# Patient Record
Sex: FEMALE | Race: WHITE | NOT HISPANIC OR LATINO | Employment: UNEMPLOYED | URBAN - METROPOLITAN AREA
[De-identification: names, ages, dates, MRNs, and addresses within clinical notes are randomized per-mention and may not be internally consistent; named-entity substitution may affect disease eponyms.]

---

## 2017-05-21 ENCOUNTER — OFFICE VISIT (OUTPATIENT)
Dept: RETAIL CLINIC | Facility: CLINIC | Age: 42
End: 2017-05-21

## 2017-05-21 VITALS
HEART RATE: 108 BPM | RESPIRATION RATE: 17 BRPM | HEIGHT: 63 IN | TEMPERATURE: 98.1 F | BODY MASS INDEX: 46.07 KG/M2 | OXYGEN SATURATION: 98 % | WEIGHT: 260 LBS

## 2017-05-21 DIAGNOSIS — J01.40 ACUTE NON-RECURRENT PANSINUSITIS: Primary | ICD-10-CM

## 2017-05-21 PROCEDURE — 99203 OFFICE O/P NEW LOW 30 MIN: CPT | Performed by: NURSE PRACTITIONER

## 2017-05-21 RX ORDER — OMEPRAZOLE 40 MG/1
40 CAPSULE, DELAYED RELEASE ORAL DAILY
COMMUNITY

## 2017-05-21 RX ORDER — PSEUDOEPHEDRINE HYDROCHLORIDE 60 MG/1
60 TABLET, FILM COATED ORAL EVERY 6 HOURS PRN
Qty: 20 TABLET | Refills: 0 | Status: SHIPPED | OUTPATIENT
Start: 2017-05-21

## 2017-05-21 RX ORDER — LISINOPRIL AND HYDROCHLOROTHIAZIDE 25; 20 MG/1; MG/1
1 TABLET ORAL DAILY
COMMUNITY

## 2017-05-21 RX ORDER — AMOXICILLIN AND CLAVULANATE POTASSIUM 875; 125 MG/1; MG/1
1 TABLET, FILM COATED ORAL 2 TIMES DAILY
Qty: 20 TABLET | Refills: 0 | Status: SHIPPED | OUTPATIENT
Start: 2017-05-21 | End: 2017-05-31

## 2017-05-21 RX ORDER — AMOXICILLIN AND CLAVULANATE POTASSIUM 875; 125 MG/1; MG/1
1 TABLET, FILM COATED ORAL 2 TIMES DAILY
Qty: 20 TABLET | Refills: 0 | Status: SHIPPED | OUTPATIENT
Start: 2017-05-21 | End: 2017-05-21 | Stop reason: SDUPTHER

## 2019-12-07 ENCOUNTER — HOSPITAL ENCOUNTER (EMERGENCY)
Facility: HOSPITAL | Age: 44
Discharge: HOME | End: 2019-12-07
Attending: EMERGENCY MEDICINE | Admitting: EMERGENCY MEDICINE

## 2019-12-07 VITALS
OXYGEN SATURATION: 89 % | TEMPERATURE: 98.2 F | SYSTOLIC BLOOD PRESSURE: 153 MMHG | DIASTOLIC BLOOD PRESSURE: 103 MMHG | HEART RATE: 108 BPM | BODY MASS INDEX: 46.07 KG/M2 | HEIGHT: 63 IN | RESPIRATION RATE: 18 BRPM | WEIGHT: 260 LBS

## 2019-12-07 DIAGNOSIS — K12.2 ORAL INFECTION: Primary | ICD-10-CM

## 2019-12-07 DIAGNOSIS — S01.502A: ICD-10-CM

## 2019-12-07 PROCEDURE — 99284 EMERGENCY DEPT VISIT MOD MDM: CPT

## 2019-12-07 RX ORDER — IBUPROFEN 600 MG/1
600 TABLET ORAL ONCE
Status: COMPLETED | OUTPATIENT
Start: 2019-12-07 | End: 2019-12-07

## 2019-12-07 RX ORDER — AMOXICILLIN AND CLAVULANATE POTASSIUM 875; 125 MG/1; MG/1
1 TABLET, FILM COATED ORAL EVERY 12 HOURS SCHEDULED
Qty: 14 TABLET | Refills: 0 | Status: SHIPPED | OUTPATIENT
Start: 2019-12-07

## 2019-12-07 RX ADMIN — IBUPROFEN 600 MG: 600 TABLET, FILM COATED ORAL at 11:33

## 2019-12-07 NOTE — DISCHARGE INSTRUCTIONS
Call [facility] today to arrange for very close outpatient follow-up.    Several studies have shown that the combination of ibuprofen and acetaminophen is more effective at pain relief than narcotics.    Take 3 over-the-counter Ibuprofen tablets every 6 hours as needed for pain. Try to take the medication with food. If you develop upper abdominal discomfort, discontinue use.    If not taking another medication which contains Tylenol/acetaminophen, you may also take Tylenol every 6 hours, with a maximum 1,000 mg (two extra strength tablets) per dose.

## 2019-12-07 NOTE — ED PROVIDER NOTES
Subjective   Magy Li is a 44 y.o. female who presents to the ED with c/o facial swelling. Yesterday morning the patient woke up with mild swelling on the right side of her mouth but did not seek medical attention. She woke up today and the swelling had worsened, spreading to the whole right side of her mouth with moderate pain. The patient has been taking Tylenol for the pain but it is not providing much relief. She has no prior history of episodes similar to the current one. The patient complains of wheezing, rhinorrhea, and cough but denies abdominal pain, chest pain, fever, chills, nausea, vomiting, and diarrhea. She has a past medical history of asthma with a surgical history including tubal ligation. The patient denies any allergies and she is a former smoker, quitting in 2007. There are no other acute complaints at this time.      History provided by:  Patient  Facial Swelling   Location:  Right side of her mouth  Quality:  Swelling. dull pain.  Severity:  Moderate  Onset quality:  Sudden  Duration:  1 day  Timing:  Constant  Progression:  Worsening  Chronicity:  New  Context:  Patient awoke yesterday with mild swelling on the right side of her mouth. she woke up this morning with worsening swelling and acute pain.  Relieved by:  Nothing  Worsened by:  Nothing  Ineffective treatments:  Tylenol  Associated symptoms: cough, rhinorrhea and wheezing    Associated symptoms: no abdominal pain, no chest pain, no diarrhea, no fever, no nausea and no vomiting        Review of Systems   Constitutional: Negative for chills and fever.   HENT: Positive for dental problem, facial swelling and rhinorrhea.    Respiratory: Positive for cough and wheezing.    Cardiovascular: Negative for chest pain.   Gastrointestinal: Negative for abdominal pain, diarrhea, nausea and vomiting.   All other systems reviewed and are negative.      Past Medical History:   Diagnosis Date   • Acid reflux    • Allergic    • Arthritis    • Asthma     • Diverticulitis of colon    • Hypertension        No Known Allergies    Past Surgical History:   Procedure Laterality Date   • APPENDECTOMY     • COLONOSCOPY     • TUBAL ABDOMINAL LIGATION         Family History   Problem Relation Age of Onset   • Heart disease Mother    • Diabetes Mother    • Diabetes Father    • Heart disease Father    • Cancer Brother    • Cancer Maternal Grandfather    • Heart disease Paternal Grandmother        Social History     Socioeconomic History   • Marital status:      Spouse name: Not on file   • Number of children: Not on file   • Years of education: Not on file   • Highest education level: Not on file   Tobacco Use   • Smoking status: Former Smoker     Packs/day: 1.00     Years: 15.00     Pack years: 15.00     Types: Cigarettes     Last attempt to quit:      Years since quittin.9   • Smokeless tobacco: Never Used   Substance and Sexual Activity   • Alcohol use: No   • Drug use: No   • Sexual activity: Defer         Objective   Physical Exam   Constitutional: She is oriented to person, place, and time. She appears well-developed and well-nourished. No distress.   The patient appears mildly uncomfortable.   HENT:   Head: Normocephalic and atraumatic.   Mouth/Throat: Oropharynx is clear and moist. Abnormal dentition. No dental abscesses.   Swelling of buccal mucosa in the right cheek just lateral to what appears to be tooth number 5 or 6.  Abrasion on the mucosal surface where tooth appears to be rubbing. This tooth is quite carus.  Throughout the entire oral cavity she has extremely poor dentition with multiple dental fractures and decaying teeth but without significant gingival inflammation.  In the cheek there are no palpable abscesses.  Clear posterior oropharynx.   Eyes: Conjunctivae are normal. No scleral icterus.   Neck: Normal range of motion. Neck supple.   Cardiovascular:   Pulses:       Radial pulses are 2+ on the right side, and 2+ on the left side.  "  Pulmonary/Chest: Effort normal and breath sounds normal. No stridor. No respiratory distress.   Musculoskeletal: Normal range of motion. She exhibits no edema.   Lymphadenopathy:     She has no cervical adenopathy.   Neurological: She is alert and oriented to person, place, and time.   Skin: Skin is warm and dry.   Psychiatric: She has a normal mood and affect. Her behavior is normal.   Nursing note and vitals reviewed.      Procedures         ED Course       No results found for this or any previous visit (from the past 24 hour(s)).  Note: In addition to lab results from this visit, the labs listed above may include labs taken at another facility or during a different encounter within the last 24 hours. Please correlate lab times with ED admission and discharge times for further clarification of the services performed during this visit.    No orders to display     Vitals:    12/07/19 1038 12/07/19 1047 12/07/19 1100 12/07/19 1130   BP: (!) 180/105 (!) 144/108 157/76 (!) 153/103   BP Location: Left arm      Patient Position: Sitting      Pulse: 108      Resp: 18      Temp: 98.2 °F (36.8 °C)      TempSrc: Oral      SpO2: 96% 93% 91% (!) 89%   Weight: 118 kg (260 lb)      Height: 160 cm (63\")        Medications   ibuprofen (ADVIL,MOTRIN) tablet 600 mg (600 mg Oral Given 12/7/19 1133)     ECG/EMG Results (last 24 hours)     ** No results found for the last 24 hours. **        No orders to display                     MDM    Final diagnoses:   Oral infection   Open wound of buccal mucosa, initial encounter       Documentation assistance provided by amaya Chen.  Information recorded by the amaya was done at my direction and has been verified and validated by me.     Rojas Chen  12/07/19 1136       Wan Meredith MD  12/10/19 7189    "

## 2022-08-08 ENCOUNTER — HOSPITAL ENCOUNTER (EMERGENCY)
Facility: HOSPITAL | Age: 47
Discharge: HOME | End: 2022-08-08
Attending: EMERGENCY MEDICINE | Admitting: EMERGENCY MEDICINE

## 2022-08-08 VITALS
HEART RATE: 110 BPM | BODY MASS INDEX: 42.68 KG/M2 | OXYGEN SATURATION: 99 % | DIASTOLIC BLOOD PRESSURE: 116 MMHG | WEIGHT: 250 LBS | TEMPERATURE: 97.9 F | RESPIRATION RATE: 18 BRPM | SYSTOLIC BLOOD PRESSURE: 162 MMHG | HEIGHT: 64 IN

## 2022-08-08 DIAGNOSIS — S30.1XXA CONTUSION OF ABDOMINAL WALL, INITIAL ENCOUNTER: ICD-10-CM
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
## 2022-08-08 PROCEDURE — 99282 EMERGENCY DEPT VISIT SF MDM: CPT
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
